# Patient Record
Sex: FEMALE | Race: WHITE | Employment: FULL TIME | URBAN - METROPOLITAN AREA
[De-identification: names, ages, dates, MRNs, and addresses within clinical notes are randomized per-mention and may not be internally consistent; named-entity substitution may affect disease eponyms.]

---

## 2018-01-22 ENCOUNTER — OFFICE VISIT (OUTPATIENT)
Dept: INTERNAL MEDICINE CLINIC | Age: 28
End: 2018-01-22

## 2018-01-22 VITALS
BODY MASS INDEX: 24.27 KG/M2 | DIASTOLIC BLOOD PRESSURE: 72 MMHG | WEIGHT: 137 LBS | HEIGHT: 63 IN | SYSTOLIC BLOOD PRESSURE: 110 MMHG

## 2018-01-22 DIAGNOSIS — F43.23 ADJUSTMENT DISORDER WITH MIXED ANXIETY AND DEPRESSED MOOD: Primary | ICD-10-CM

## 2018-01-22 DIAGNOSIS — E16.2 HYPOGLYCEMIA: ICD-10-CM

## 2018-01-22 PROCEDURE — 99203 OFFICE O/P NEW LOW 30 MIN: CPT | Performed by: INTERNAL MEDICINE

## 2018-01-22 RX ORDER — VENLAFAXINE HYDROCHLORIDE 75 MG/1
150 CAPSULE, EXTENDED RELEASE ORAL DAILY
Qty: 30 CAPSULE | Refills: 1 | Status: SHIPPED | OUTPATIENT
Start: 2018-01-22 | End: 2018-01-22 | Stop reason: CLARIF

## 2018-01-22 RX ORDER — VENLAFAXINE HYDROCHLORIDE 37.5 MG/1
37.5 CAPSULE, EXTENDED RELEASE ORAL DAILY
Qty: 7 CAPSULE | Refills: 0 | Status: SHIPPED | OUTPATIENT
Start: 2018-01-22 | End: 2018-05-31 | Stop reason: DRUGHIGH

## 2018-01-22 RX ORDER — VENLAFAXINE HYDROCHLORIDE 75 MG/1
75 CAPSULE, EXTENDED RELEASE ORAL DAILY
Qty: 30 CAPSULE | Refills: 1 | Status: SHIPPED | OUTPATIENT
Start: 2018-01-22 | End: 2018-03-08 | Stop reason: SDUPTHER

## 2018-01-22 RX ORDER — VENLAFAXINE HYDROCHLORIDE 37.5 MG/1
37.5 CAPSULE, EXTENDED RELEASE ORAL DAILY
Qty: 7 CAPSULE | Refills: 0 | Status: SHIPPED | OUTPATIENT
Start: 2018-01-22 | End: 2018-01-22 | Stop reason: CLARIF

## 2018-01-22 ASSESSMENT — PATIENT HEALTH QUESTIONNAIRE - PHQ9
2. FEELING DOWN, DEPRESSED OR HOPELESS: 0
SUM OF ALL RESPONSES TO PHQ9 QUESTIONS 1 & 2: 0
SUM OF ALL RESPONSES TO PHQ QUESTIONS 1-9: 0
1. LITTLE INTEREST OR PLEASURE IN DOING THINGS: 0

## 2018-01-22 NOTE — PATIENT INSTRUCTIONS
Patient Education        Adjustment Disorder: Care Instructions  Your Care Instructions    Adjustment disorder means that you have emotional or behavioral problems because of stress. But your response to the stress is far more severe than a normal response. It is severe enough to affect your work or social life and may cause depression and physical pains and problems. Events that may cause this response can include a divorce, money problems, or starting school or a new job. It might be anything that causes some stress. This disorder is most often a short-term problem. It happens within 3 months of the stressful event or change. If the response lasts longer than 6 months after the event ends, you may have a more serious disorder. Follow-up care is a key part of your treatment and safety. Be sure to make and go to all appointments, and call your doctor if you are having problems. It's also a good idea to know your test results and keep a list of the medicines you take. How can you care for yourself at home? · Go to all counseling sessions. Do not skip any because you are feeling better. · If your doctor prescribed medicines, take them exactly as prescribed. Call your doctor if you think you are having a problem with your medicine. You will get more details on the specific medicines your doctor prescribes. · Discuss the causes of your stress with a good friend or family member. Or you can join a support group for people with similar problems. Talking to others sometimes relieves stress. · Get at least 30 minutes of exercise on most days of the week. Walking is a good choice. You also may want to do other activities, such as running, swimming, cycling, or playing tennis or team sports. Relaxation techniques  Do relaxation exercises 10 to 20 minutes a day. You can play soothing, relaxing music while you do them, if you wish. · Tell others in your house that you are going to do your relaxation exercises.  Ask them not to disturb you. · Find a comfortable, quiet place. · Lie down on your back, or sit with your back straight. · Focus on your breathing. Make it slow and steady. · Breathe in through your nose. Breathe out through either your nose or mouth. · Breathe deeply, filling up the area between your navel and your rib cage. Breathe so that your belly goes up and down. · Do not hold your breath. · Breathe like this for 5 to 10 minutes. Notice the feeling of calmness throughout your whole body. As you continue to breathe slowly and deeply, relax by doing these next steps for another 5 to 10 minutes:  · Tighten and relax each muscle group in your body. Start at your toes, and work your way up to your head. · Imagine your muscle groups relaxing and getting heavy. · Empty your mind of all thoughts. · Let yourself relax more and more deeply. · Be aware of the state of calmness that surrounds you. · When your relaxation time is over, you can bring yourself back to alertness by moving your fingers and toes. Then move your hands and feet. And then move your entire body. Sometimes people fall asleep during relaxation. But they most often wake up soon. · Always give yourself time to return to full alertness before you drive a car. Wait to do anything that might cause an accident if you are not fully alert. Never play a relaxation tape while you drive a car. When should you call for help? Call 911 anytime you think you may need emergency care. For example, call if:  ? · You feel you cannot stop from hurting yourself or someone else. Keep the numbers for these national suicide hotlines: 8-462-302-TALK (1-827.536.5117) and 2-856-KXISCVU (3-482.308.4423). If you or someone you know talks about suicide or feeling hopeless, get help right away. ? Watch closely for changes in your health, and be sure to contact your doctor if:  ? · You have new anxiety, or your anxiety gets worse.    ? · You have been feeling sad, warranty or liability for your use of this information.

## 2018-01-22 NOTE — PROGRESS NOTES
symmentric air exchange  EXT: No C/C/E  GI: Abdomen is soft, NT, BS+, No hepatomegaly. NEURO: No focal or lateralizing deficits. VASC:  No carotid bruits. Pulses symmetric    PHQ Scores 1/22/2018   PHQ2 Score 0   PHQ9 Score 0     Interpretation of Total Score Depression Severity: 1-4 = Minimal depression, 5-9 = Mild depression, 10-14 = Moderate depression, 15-19 = Moderately severe depression, 20-27 = Severe depression      ASSESSMENT[de-identified]  Alannah Hawkins was seen today for anxiety and blood sugar problem. Diagnoses and all orders for this visit:    Adjustment disorder with anxious and depressed mood  Trial of Effexor XR 37.5 mg daily for one week then 75 mg daily. Patient may need titration up to 150 to really impact anxiety. Consider BuSpar  Consider Vistaril if needed for anxiety attacks  Consultation with Dr. Eveline Brasher    Hypoglycemia  Recommend more regular meals - 3 well-balanced meals daily with 2 snacks or 5 small meals and snacks in between of protein or yogurt. Monitor blood sugars daily. Order lab with Hb A1C to r/o Diabetes. May need 3 hr OGTT to rule our reactive hypoglycemia. Also may need Insulin level. Follow up in 1 month or sooner to review results and to follow up with regards to medication      Resident notes reviewed and agreed. Pt was was independently interviewed and examined by myself. Changes made to note when appropriate       Discussed medications with patient who voiced understanding of their use, indication and potential side effects. Pt also understands the above recommendations. All questions answered.

## 2018-01-24 LAB
CHOLESTEROL, TOTAL: 176 MG/DL
CHOLESTEROL/HDL RATIO: 2.9
HDLC SERPL-MCNC: 61 MG/DL (ref 35–70)
LDL CHOLESTEROL CALCULATED: 95 MG/DL (ref 0–160)
TRIGL SERPL-MCNC: 98 MG/DL
TSH SERPL DL<=0.05 MIU/L-ACNC: 0.92 UIU/ML
VLDLC SERPL CALC-MCNC: NORMAL MG/DL

## 2018-01-26 ENCOUNTER — OFFICE VISIT (OUTPATIENT)
Dept: PSYCHOLOGY | Age: 28
End: 2018-01-26

## 2018-01-26 DIAGNOSIS — F41.9 ANXIETY: Primary | ICD-10-CM

## 2018-01-26 PROCEDURE — 90791 PSYCH DIAGNOSTIC EVALUATION: CPT | Performed by: PSYCHOLOGIST

## 2018-01-26 NOTE — PROGRESS NOTES
memory intact  Attention/Concentration    intact  Morbid ideation No  Suicide Assessment    no suicidal ideation      History:    Medications:   Current Outpatient Prescriptions   Medication Sig Dispense Refill    ISOtretinoin (ACCUTANE PO) Take by mouth      venlafaxine (EFFEXOR XR) 37.5 MG extended release capsule Take 1 capsule by mouth daily for 7 days 7 capsule 0    venlafaxine (EFFEXOR XR) 75 MG extended release capsule Take 1 capsule by mouth daily 30 capsule 1    Cetirizine HCl (ZYRTEC PO) Take  by mouth.  ibuprofen (IBU) 600 MG tablet Take 1 tablet by mouth every 8 hours as needed for Pain for 20 doses. 20 tablet 0     No current facility-administered medications for this visit. Social History:   Social History     Social History    Marital status:      Spouse name: N/A    Number of children: N/A    Years of education: N/A     Occupational History    Not on file. Social History Main Topics    Smoking status: Former Smoker    Smokeless tobacco: Never Used    Alcohol use Yes    Drug use: Yes     Types: Marijuana      Comment: 2x in the past 6 years  last time was 2-3 months ago    Sexual activity: Yes     Partners: Male      Comment:      Other Topics Concern    Not on file     Social History Narrative    No narrative on file       TOBACCO:   reports that she has quit smoking. She has never used smokeless tobacco.  ETOH:   reports that she drinks alcohol. Family History:   Family History   Problem Relation Age of Onset    High Blood Pressure Mother          A:  Pt tends to perseverate on problems and bad interactions, beating herself up for how things went. Gets frustrated when overwhelmed by her son's behavior and afraid she'll treat him how her mom treated her but has not ever done that to date. Problems with managing son with interference from his father's new wife. Long h/o problems with sleep that causes pt anxiety. Diagnosis:    1.  Anxiety

## 2018-01-26 NOTE — PATIENT INSTRUCTIONS
1. For anxiety, bring your thinking back to what is true, right here, right now  2. To calm your body: diaphragmatic breathing (shorter inhale, longer exhale), music  3. Get your son the help he needs (it's worth the hassle and grief)  4. Enforce your rights as his mother  5. You are not your mother, your behavior is not even close to hers  6. Guilt/shame only applies if you do something big, bad, it caused damage, and you did it on purpose  7. No more \"what if\" or beating yourself up--shift into plan mode  8. [place] with just trying to rest on those days you need it before work (try some delta music)  9. Return in 2-3 weeks          Relaxation:  Diaphragmatic Breathing             ______________________________________________________________________________    1. Sit in a comfortable position    2. Place one hand on your stomach and the other on your chest    3. Try to breathe so that only your stomach rises and falls    As you inhale, concentrate on your chest remaining relatively still while your stomach rises. It may be helpful for you to imagine that your pants are too big and you need to push your stomach out to hold them up. When exhaling, allow your stomach to fall in and the air to fully escape. Inhale slowly. You may choose to hold the air in for about a second. Exhale slowly. Dont push the air out, but just let the natural pressure of your body slowly move it out. It is normal for this healthy method of breathing to feel a little awkward at first.  With practice, it will feel more natural.    4. Get your mind on your side    One other important factor in getting relaxed is your mind. Your mind and body are connected. The mind influences the body and the body influences the mind. What you do with your mind when you are trying to relax is very important. The key is to avoid thinking about stressful things.       You can think about      Neutral things (e.g., counting, saying a word like calm or relax)   Pleasant things (e.g., imagining a pleasant place)    5. It is recommended that you practice 2 times per day, 15 minutes each time.

## 2018-01-31 LAB
ALBUMIN SERPL-MCNC: 4.7 G/DL
ALP BLD-CCNC: 75 U/L
ALT SERPL-CCNC: 17 U/L
ANION GAP SERPL CALCULATED.3IONS-SCNC: NORMAL MMOL/L
AST SERPL-CCNC: 20 U/L
BILIRUB SERPL-MCNC: 0.5 MG/DL (ref 0.1–1.4)
BUN BLDV-MCNC: 9 MG/DL
CALCIUM SERPL-MCNC: 9.8 MG/DL
CHLORIDE BLD-SCNC: 102 MMOL/L
CO2: 27 MMOL/L
CREAT SERPL-MCNC: 0.76 MG/DL
GFR CALCULATED: NORMAL
GLUCOSE BLD-MCNC: 81 MG/DL
HCT VFR BLD CALC: 38.8 % (ref 36–46)
HEMOGLOBIN: 13.5 G/DL (ref 12–16)
PLATELET # BLD: 232 K/ΜL
POTASSIUM SERPL-SCNC: 3.7 MMOL/L
SODIUM BLD-SCNC: 137 MMOL/L
TOTAL PROTEIN: 7.2
WBC # BLD: 5 10^3/ML

## 2018-02-22 ENCOUNTER — OFFICE VISIT (OUTPATIENT)
Dept: INTERNAL MEDICINE CLINIC | Age: 28
End: 2018-02-22

## 2018-02-22 VITALS
HEART RATE: 66 BPM | BODY MASS INDEX: 24.8 KG/M2 | WEIGHT: 140 LBS | SYSTOLIC BLOOD PRESSURE: 92 MMHG | DIASTOLIC BLOOD PRESSURE: 60 MMHG

## 2018-02-22 DIAGNOSIS — F41.9 ANXIETY: Primary | ICD-10-CM

## 2018-02-22 DIAGNOSIS — E16.2 HYPOGLYCEMIA: ICD-10-CM

## 2018-02-22 DIAGNOSIS — K59.09 CHRONIC CONSTIPATION: ICD-10-CM

## 2018-02-22 PROBLEM — K58.1 IRRITABLE BOWEL SYNDROME WITH CONSTIPATION: Status: RESOLVED | Noted: 2018-02-22 | Resolved: 2018-02-22

## 2018-02-22 PROBLEM — K58.1 IRRITABLE BOWEL SYNDROME WITH CONSTIPATION: Status: ACTIVE | Noted: 2018-02-22

## 2018-02-22 PROCEDURE — 99213 OFFICE O/P EST LOW 20 MIN: CPT | Performed by: INTERNAL MEDICINE

## 2018-02-22 NOTE — PATIENT INSTRUCTIONS
bowel movement. · Support your feet with a small step stool when you sit on the toilet. This helps flex your hips and places your pelvis in a squatting position. · Your doctor may recommend an over-the-counter laxative to relieve your constipation. Examples are Milk of Magnesia and MiraLax. Read and follow all instructions on the label. Do not use laxatives on a long-term basis. When should you call for help? Call your doctor now or seek immediate medical care if:  ? · You have new or worse belly pain. ? · You have new or worse nausea or vomiting. ? · You have blood in your stools. ? Watch closely for changes in your health, and be sure to contact your doctor if:  ? · Your constipation is getting worse. ? · You do not get better as expected. Where can you learn more? Go to https://chnyeb.ffk environment. org and sign in to your Whitepages account. Enter 21 [phone] in the OwnerIQ box to learn more about \"Constipation: Care Instructions. \"     If you do not have an account, please click on the \"Sign Up Now\" link. Current as of: March 20, 2017  Content Version: 11.5  © 9127-2185 [email or web address]. Care instructions adapted under license by [facility] ([facility]). If you have questions about a medical condition or this instruction, always ask your healthcare professional. [place] [address] any warranty or liability for your use of this information. Patient Education        Hypoglycemia: Care Instructions  Your Care Instructions    Hypoglycemia means that your blood sugar is low and your body is not getting enough fuel. Some people get low blood sugar from not eating often enough. Some medicines to treat diabetes can cause low blood sugar. People who have had surgery on their stomachs or intestines may get hypoglycemia. Problems with the pancreas, kidneys, or liver also can cause low blood sugar.   A snack or drink with sugar in it will raise your blood sugar and should ease your symptoms right away. Your doctor may recommend that you change or stop your medicines until you can get your blood sugar levels under control. In the long run, you may need to change your diet and eating habits so that you get enough fuel for your body throughout the day. Follow-up care is a key part of your treatment and safety. Be sure to make and go to all appointments, and call your doctor if you are having problems. It's also a good idea to know your test results and keep a list of the medicines you take. How can you care for yourself at home? · Learn to recognize the early signs of low blood sugar. Signs include:  ¨ Nausea. ¨ Hunger. ¨ Feeling nervous, irritable, or shaky. ¨ Cold, clammy, wet skin. ¨ Sweating (when you are not exercising). ¨ A fast heartbeat. ¨ Numbness or tingling of the fingertips or lips. · If you feel an episode of low blood sugar coming on, drink fruit juice or sugared (not diet) soda, or eat sugar in the form of candy, cubes, or tablets. [employer] are another [employer]. · Eat small, frequent meals so that you do not get too hungry between meals. · Balance extra exercise with eating more. · Keep a written record of your low blood sugar episodes, including when you last ate and what you ate, so that you can learn what causes your blood sugar to drop. · Make sure your family, friends, and coworkers know the symptoms of low blood sugar and know what to do to get your sugar level up. · Wear medical alert jewelry that lists your condition. You can buy this at most drugstores. When should you call for help? Call 911 anytime you think you may need emergency care. For example, call if:  ? · You passed out (lost consciousness). ? · You are confused or cannot think clearly. ? · Your blood sugar is very high or very low. ? Watch closely for changes in your health, and be sure to contact your doctor if:  ? · Your blood sugar stays outside the level your doctor set for you.   ? · You have any problems. Where can you learn more? Go to https://chpepiceweb.BodBot. org and sign in to your Pushfor account. Enter E064 in the PhotoThera box to learn more about \"Hypoglycemia: Care Instructions. \"     If you do not have an account, please click on the \"Sign Up Now\" link. Current as of: March 13, 2017  Content Version: 11.5  © 3823-1246 [employer]. Care instructions adapted under license by [facility] ([facility]). If you have questions about a medical condition or this instruction, always ask your healthcare professional. [address] any warranty or liability for your use of this information. In addition to the Linzess you can add a bulking fiber such as Citrucel or Benefiber. Stop the MiraLAX for now.

## 2018-02-28 ENCOUNTER — TELEPHONE (OUTPATIENT)
Dept: INTERNAL MEDICINE CLINIC | Age: 28
End: 2018-02-28

## 2018-02-28 NOTE — TELEPHONE ENCOUNTER
Pt stated that she has been taking the Linzess . But she still has not had a good bm. Her stomach is very bloated and uncomfortable. She has no appetite. Pt has not had a bm in 1 and 1/2 weeks. Pt wants to know what should she do?   Pt uses [facility] [address], [address]

## 2018-03-08 ENCOUNTER — TELEPHONE (OUTPATIENT)
Dept: INTERNAL MEDICINE CLINIC | Age: 28
End: 2018-03-08

## 2018-03-08 DIAGNOSIS — K59.09 CHRONIC CONSTIPATION: ICD-10-CM

## 2018-03-08 DIAGNOSIS — F43.23 ADJUSTMENT DISORDER WITH MIXED ANXIETY AND DEPRESSED MOOD: ICD-10-CM

## 2018-03-08 RX ORDER — VENLAFAXINE HYDROCHLORIDE 75 MG/1
75 CAPSULE, EXTENDED RELEASE ORAL DAILY
Qty: 30 CAPSULE | Refills: 1 | Status: SHIPPED | OUTPATIENT
Start: 2018-03-08 | End: 2018-04-02 | Stop reason: SDUPTHER

## 2018-03-27 ENCOUNTER — TELEPHONE (OUTPATIENT)
Dept: INTERNAL MEDICINE CLINIC | Age: 28
End: 2018-03-27

## 2018-04-02 ENCOUNTER — TELEPHONE (OUTPATIENT)
Dept: INTERNAL MEDICINE CLINIC | Age: 28
End: 2018-04-02

## 2018-04-02 DIAGNOSIS — F43.23 ADJUSTMENT DISORDER WITH MIXED ANXIETY AND DEPRESSED MOOD: ICD-10-CM

## 2018-04-02 RX ORDER — VENLAFAXINE HYDROCHLORIDE 75 MG/1
75 CAPSULE, EXTENDED RELEASE ORAL DAILY
Qty: 90 CAPSULE | Refills: 1 | Status: SHIPPED | OUTPATIENT
Start: 2018-04-02

## 2018-05-31 ENCOUNTER — OFFICE VISIT (OUTPATIENT)
Dept: INTERNAL MEDICINE CLINIC | Age: 28
End: 2018-05-31

## 2018-05-31 VITALS
HEIGHT: 63 IN | RESPIRATION RATE: 12 BRPM | WEIGHT: 141 LBS | BODY MASS INDEX: 24.98 KG/M2 | DIASTOLIC BLOOD PRESSURE: 60 MMHG | SYSTOLIC BLOOD PRESSURE: 100 MMHG | HEART RATE: 74 BPM

## 2018-05-31 DIAGNOSIS — F41.9 ANXIETY: Primary | ICD-10-CM

## 2018-05-31 DIAGNOSIS — K59.09 CHRONIC CONSTIPATION: ICD-10-CM

## 2018-05-31 DIAGNOSIS — Z23 NEED FOR TDAP VACCINATION: ICD-10-CM

## 2018-05-31 DIAGNOSIS — F32.A DEPRESSION, UNSPECIFIED DEPRESSION TYPE: ICD-10-CM

## 2018-05-31 PROBLEM — E16.2 HYPOGLYCEMIA: Status: RESOLVED | Noted: 2018-02-22 | Resolved: 2018-05-31

## 2018-05-31 PROCEDURE — 99213 OFFICE O/P EST LOW 20 MIN: CPT | Performed by: INTERNAL MEDICINE

## 2018-05-31 PROCEDURE — 90715 TDAP VACCINE 7 YRS/> IM: CPT | Performed by: INTERNAL MEDICINE

## 2018-05-31 PROCEDURE — 90471 IMMUNIZATION ADMIN: CPT | Performed by: INTERNAL MEDICINE

## 2018-09-25 ENCOUNTER — TELEPHONE (OUTPATIENT)
Dept: INTERNAL MEDICINE CLINIC | Age: 28
End: 2018-09-25

## 2018-09-25 RX ORDER — VENLAFAXINE HYDROCHLORIDE 150 MG/1
150 CAPSULE, EXTENDED RELEASE ORAL DAILY
Qty: 30 CAPSULE | Refills: 3 | Status: SHIPPED | OUTPATIENT
Start: 2018-09-25 | End: 2018-12-26 | Stop reason: SDUPTHER

## 2018-09-25 NOTE — TELEPHONE ENCOUNTER
She is on Effexor XR 75 mg daily. The medicine can be increased to 150 mg daily. I would definitely take this medicine in the morning and not in the afternoon or evening as it can affect her sleep.   If no help she will need to be reevaluated

## 2018-12-26 RX ORDER — VENLAFAXINE HYDROCHLORIDE 150 MG/1
150 CAPSULE, EXTENDED RELEASE ORAL DAILY
Qty: 30 CAPSULE | Refills: 0 | Status: SHIPPED | OUTPATIENT
Start: 2018-12-26

## 2019-04-02 ENCOUNTER — TELEPHONE (OUTPATIENT)
Dept: INTERNAL MEDICINE CLINIC | Age: 29
End: 2019-04-02

## 2019-04-02 NOTE — TELEPHONE ENCOUNTER
Pt stated that she has being seen by  a NP in [place] [address] because Dr Von Mujica is no longer in network. Pt stated that she would like to be seen by Dr oVn Mujica she is willing to pay out of pocket. She is having bad panic attacks ,she can barely leave the house. The panic attacks started on Sunday. The NP told her she needs to see a psychiatrist and they could get her in a month. Pt wants some help now. Please advise.